# Patient Record
Sex: FEMALE | Race: WHITE | NOT HISPANIC OR LATINO | Employment: OTHER | URBAN - METROPOLITAN AREA
[De-identification: names, ages, dates, MRNs, and addresses within clinical notes are randomized per-mention and may not be internally consistent; named-entity substitution may affect disease eponyms.]

---

## 2022-07-29 ENCOUNTER — HOSPITAL ENCOUNTER (EMERGENCY)
Facility: HOSPITAL | Age: 67
Discharge: HOME | End: 2022-07-29
Attending: EMERGENCY MEDICINE
Payer: MEDICARE

## 2022-07-29 ENCOUNTER — APPOINTMENT (EMERGENCY)
Dept: RADIOLOGY | Facility: HOSPITAL | Age: 67
End: 2022-07-29
Payer: MEDICARE

## 2022-07-29 VITALS
DIASTOLIC BLOOD PRESSURE: 82 MMHG | RESPIRATION RATE: 16 BRPM | TEMPERATURE: 98.2 F | BODY MASS INDEX: 28.69 KG/M2 | HEART RATE: 84 BPM | HEIGHT: 57 IN | SYSTOLIC BLOOD PRESSURE: 172 MMHG | WEIGHT: 133 LBS | OXYGEN SATURATION: 98 %

## 2022-07-29 DIAGNOSIS — R07.9 CHEST PAIN, UNSPECIFIED: ICD-10-CM

## 2022-07-29 DIAGNOSIS — K21.9 GERD (GASTROESOPHAGEAL REFLUX DISEASE): ICD-10-CM

## 2022-07-29 DIAGNOSIS — F41.9 ANXIETY: Primary | ICD-10-CM

## 2022-07-29 LAB
ANION GAP SERPL CALCULATED.3IONS-SCNC: 12 MMOL/L (ref 4–13)
BASOPHILS # BLD AUTO: 0.03 THOUSANDS/ΜL (ref 0–0.1)
BASOPHILS NFR BLD AUTO: 1 % (ref 0–1)
BUN SERPL-MCNC: 9 MG/DL (ref 5–25)
CALCIUM SERPL-MCNC: 9.5 MG/DL (ref 8.4–10.2)
CARDIAC TROPONIN I PNL SERPL HS: 5 NG/L
CHLORIDE SERPL-SCNC: 105 MMOL/L (ref 96–108)
CO2 SERPL-SCNC: 22 MMOL/L (ref 21–32)
CREAT SERPL-MCNC: 0.68 MG/DL (ref 0.6–1.3)
EOSINOPHIL # BLD AUTO: 0.04 THOUSAND/ΜL (ref 0–0.61)
EOSINOPHIL NFR BLD AUTO: 1 % (ref 0–6)
ERYTHROCYTE [DISTWIDTH] IN BLOOD BY AUTOMATED COUNT: 16.6 % (ref 11.6–15.1)
GFR SERPL CREATININE-BSD FRML MDRD: 90 ML/MIN/1.73SQ M
GLUCOSE SERPL-MCNC: 105 MG/DL (ref 65–140)
HCT VFR BLD AUTO: 41.3 % (ref 34.8–46.1)
HGB BLD-MCNC: 13.4 G/DL (ref 11.5–15.4)
IMM GRANULOCYTES # BLD AUTO: 0.02 THOUSAND/UL (ref 0–0.2)
IMM GRANULOCYTES NFR BLD AUTO: 0 % (ref 0–2)
LYMPHOCYTES # BLD AUTO: 1.55 THOUSANDS/ΜL (ref 0.6–4.47)
LYMPHOCYTES NFR BLD AUTO: 27 % (ref 14–44)
MCH RBC QN AUTO: 26.6 PG (ref 26.8–34.3)
MCHC RBC AUTO-ENTMCNC: 32.4 G/DL (ref 31.4–37.4)
MCV RBC AUTO: 82 FL (ref 82–98)
MONOCYTES # BLD AUTO: 0.54 THOUSAND/ΜL (ref 0.17–1.22)
MONOCYTES NFR BLD AUTO: 10 % (ref 4–12)
NEUTROPHILS # BLD AUTO: 3.48 THOUSANDS/ΜL (ref 1.85–7.62)
NEUTS SEG NFR BLD AUTO: 61 % (ref 43–75)
NRBC BLD AUTO-RTO: 0 /100 WBCS
PLATELET # BLD AUTO: 241 THOUSANDS/UL (ref 149–390)
PMV BLD AUTO: 8.8 FL (ref 8.9–12.7)
POTASSIUM SERPL-SCNC: 3.5 MMOL/L (ref 3.5–5.3)
RBC # BLD AUTO: 5.03 MILLION/UL (ref 3.81–5.12)
SODIUM SERPL-SCNC: 139 MMOL/L (ref 135–147)
WBC # BLD AUTO: 5.66 THOUSAND/UL (ref 4.31–10.16)

## 2022-07-29 PROCEDURE — 96376 TX/PRO/DX INJ SAME DRUG ADON: CPT

## 2022-07-29 PROCEDURE — 84484 ASSAY OF TROPONIN QUANT: CPT

## 2022-07-29 PROCEDURE — 71045 X-RAY EXAM CHEST 1 VIEW: CPT

## 2022-07-29 PROCEDURE — 87636 SARSCOV2 & INF A&B AMP PRB: CPT

## 2022-07-29 PROCEDURE — 80048 BASIC METABOLIC PNL TOTAL CA: CPT

## 2022-07-29 PROCEDURE — 99285 EMERGENCY DEPT VISIT HI MDM: CPT

## 2022-07-29 PROCEDURE — 96374 THER/PROPH/DIAG INJ IV PUSH: CPT

## 2022-07-29 PROCEDURE — 85025 COMPLETE CBC W/AUTO DIFF WBC: CPT

## 2022-07-29 PROCEDURE — 93005 ELECTROCARDIOGRAM TRACING: CPT

## 2022-07-29 PROCEDURE — 36415 COLL VENOUS BLD VENIPUNCTURE: CPT

## 2022-07-29 RX ORDER — LORAZEPAM 2 MG/ML
0.5 INJECTION INTRAMUSCULAR ONCE
Status: COMPLETED | OUTPATIENT
Start: 2022-07-29 | End: 2022-07-29

## 2022-07-29 RX ORDER — HYDROXYZINE HYDROCHLORIDE 25 MG/1
25 TABLET, FILM COATED ORAL EVERY 6 HOURS PRN
Qty: 12 TABLET | Refills: 0 | Status: SHIPPED | OUTPATIENT
Start: 2022-07-29

## 2022-07-29 RX ORDER — LIDOCAINE HYDROCHLORIDE 20 MG/ML
15 SOLUTION OROPHARYNGEAL ONCE
Status: COMPLETED | OUTPATIENT
Start: 2022-07-29 | End: 2022-07-29

## 2022-07-29 RX ORDER — MAGNESIUM HYDROXIDE/ALUMINUM HYDROXICE/SIMETHICONE 120; 1200; 1200 MG/30ML; MG/30ML; MG/30ML
30 SUSPENSION ORAL ONCE
Status: COMPLETED | OUTPATIENT
Start: 2022-07-29 | End: 2022-07-29

## 2022-07-29 RX ADMIN — LIDOCAINE HYDROCHLORIDE 15 ML: 20 SOLUTION ORAL; TOPICAL at 16:03

## 2022-07-29 RX ADMIN — ALUMINA, MAGNESIA, AND SIMETHICONE ORAL SUSPENSION REGULAR STRENGTH 30 ML: 1200; 1200; 120 SUSPENSION ORAL at 16:03

## 2022-07-29 RX ADMIN — LORAZEPAM 0.5 MG: 2 INJECTION INTRAMUSCULAR; INTRAVENOUS at 16:03

## 2022-07-29 RX ADMIN — LORAZEPAM 0.5 MG: 2 INJECTION INTRAMUSCULAR; INTRAVENOUS at 15:15

## 2022-07-29 NOTE — ED PROVIDER NOTES
History  Chief Complaint   Patient presents with    Shortness of Breath     Pt complains of SoB on and off since Wednesday night  Pt states that she use albuterol last night and it helps but today it isn't  Pt states that she had a bypass a few years ago with no complications  Pt has Hx asthma  Patient is a 13-year-old female with past medical history of asthma presenting for evaluation of 3 days of shortness of breath  She notes she has been on a road trip from [place] to [place] to visit family including her elderly mother over the last week  Three nights ago, after seeing her mother earlier that day, she felt short of breath and was barely able to sleep  She denies pain but endorses "chest discomfort" and rates it 5/10, like a pressure, and non-radiating  She is able to walk the same distances as usual but feels her breathing gets heavier  She has been worked up outpatient for a chronic cough and recently completed a course of doxycycline which she reports greatly improved her cough  She has not had a return of cough with this new shortness of breath  She denies fevers, chills, sore throat, headache, vision changes, abdominal pain, N/V/D, urinary changes, leg swelling, and skin changes  She notes her and her  have been driving 6-8 hours daily but they stop every 2h for a bathroom break and to stretch their legs  She notes a feeling of anxiety and her  details that seeing her elderly mother has made her much more anxious than normal  She takes a baby aspirin daily for heart health        History provided by:  Patient and significant other   used: No    Shortness of Breath  Severity:  Moderate  Onset quality:  Unable to specify  Duration:  3 days  Timing:  Intermittent  Progression:  Waxing and waning  Chronicity:  New  Context: activity, emotional upset and strong odors    Context: not URI    Relieved by:  Rest and inhaler  Worsened by:  Stress and emotional stress  Ineffective treatments:  Inhaler  Associated symptoms: chest pain    Associated symptoms: no abdominal pain, no cough, no diaphoresis, no ear pain, no fever, no headaches, no hemoptysis, no neck pain, no rash, no sore throat, no sputum production, no syncope, no vomiting and no wheezing    Risk factors: obesity    Risk factors: no recent alcohol use, no family hx of DVT, no hx of cancer, no hx of PE/DVT, no prolonged immobilization, no recent surgery and no tobacco use        None       Past Medical History:   Diagnosis Date    Asthma        History reviewed  No pertinent surgical history  History reviewed  No pertinent family history  I have reviewed and agree with the history as documented  E-Cigarette/Vaping     E-Cigarette/Vaping Substances          Review of Systems   Constitutional: Negative for appetite change, chills, diaphoresis, fatigue, fever and unexpected weight change  HENT: Negative for congestion, ear pain, rhinorrhea, sore throat and trouble swallowing  Eyes: Negative for pain and visual disturbance  Respiratory: Positive for chest tightness and shortness of breath  Negative for cough, hemoptysis, sputum production and wheezing  Cardiovascular: Positive for chest pain  Negative for palpitations, leg swelling and syncope  Gastrointestinal: Negative for abdominal pain, diarrhea, nausea and vomiting  Genitourinary: Negative for difficulty urinating, dysuria, frequency, hematuria and urgency  Musculoskeletal: Negative for arthralgias, back pain and neck pain  Skin: Negative for color change and rash  Neurological: Negative for dizziness, seizures, syncope, weakness, light-headedness and headaches  All other systems reviewed and are negative  Physical Exam  Physical Exam  Vitals and nursing note reviewed  Constitutional:       General: She is awake  She is not in acute distress  Appearance: Normal appearance  She is well-developed and normal weight   She is not toxic-appearing or diaphoretic  Comments: Visibly anxious, fast speech   HENT:      Head: Normocephalic and atraumatic  Jaw: There is normal jaw occlusion  Nose: Nose normal       Mouth/Throat:      Lips: Pink  No lesions  Mouth: Mucous membranes are moist       Pharynx: Oropharynx is clear  Uvula midline  No pharyngeal swelling, oropharyngeal exudate, posterior oropharyngeal erythema or uvula swelling  Tonsils: No tonsillar exudate or tonsillar abscesses  Eyes:      General: Lids are normal  Vision grossly intact  Gaze aligned appropriately  No visual field deficit  Extraocular Movements: Extraocular movements intact  Right eye: Normal extraocular motion  Left eye: Normal extraocular motion  Conjunctiva/sclera: Conjunctivae normal       Right eye: Right conjunctiva is not injected  Left eye: Left conjunctiva is not injected  Comments: Glasses   Neck:      Trachea: Trachea and phonation normal  No abnormal tracheal secretions  Cardiovascular:      Rate and Rhythm: Normal rate and regular rhythm  Pulses: Normal pulses  Radial pulses are 2+ on the right side and 2+ on the left side  Dorsalis pedis pulses are 2+ on the right side and 2+ on the left side  Posterior tibial pulses are 2+ on the right side and 2+ on the left side  Heart sounds: Normal heart sounds, S1 normal and S2 normal  No murmur heard  Pulmonary:      Effort: Pulmonary effort is normal  Tachypnea present  No accessory muscle usage, prolonged expiration, respiratory distress or retractions  Breath sounds: Normal breath sounds and air entry  No stridor, decreased air movement or transmitted upper airway sounds  No decreased breath sounds, wheezing, rhonchi or rales  Chest:      Chest wall: No tenderness  Abdominal:      Palpations: Abdomen is soft  Tenderness: There is no abdominal tenderness  There is no guarding or rebound   Negative signs include Gtz's sign, Rovsing's sign and McBurney's sign  Musculoskeletal:         General: Normal range of motion  Cervical back: Normal range of motion and neck supple  Right lower leg: No edema  Left lower leg: No edema  Skin:     General: Skin is warm and dry  Capillary Refill: Capillary refill takes less than 2 seconds  Findings: No rash  Comments: Warm and well perfused   Neurological:      General: No focal deficit present  Mental Status: She is alert and oriented to person, place, and time  Mental status is at baseline  GCS: GCS eye subscore is 4  GCS verbal subscore is 5  GCS motor subscore is 6  Cranial Nerves: No dysarthria or facial asymmetry  Sensory: Sensation is intact  Motor: Motor function is intact  Coordination: Coordination is intact  Gait: Gait is intact  Gait normal    Psychiatric:         Attention and Perception: Attention and perception normal          Mood and Affect: Affect normal  Mood is anxious  Speech: Speech normal          Behavior: Behavior normal  Behavior is cooperative  Thought Content:  Thought content normal          Cognition and Memory: Cognition and memory normal          Judgment: Judgment normal          Vital Signs  ED Triage Vitals   Temperature Pulse Respirations Blood Pressure SpO2   07/29/22 1342 07/29/22 1342 07/29/22 1342 07/29/22 1342 07/29/22 1342   98 2 °F (36 8 °C) 91 18 (!) 205/86 97 %      Temp Source Heart Rate Source Patient Position - Orthostatic VS BP Location FiO2 (%)   07/29/22 1336 07/29/22 1336 07/29/22 1415 07/29/22 1336 --   Oral Monitor Sitting Left arm       Pain Score       07/29/22 1415       5           Vitals:    07/29/22 1342 07/29/22 1415 07/29/22 1619 07/29/22 1655   BP: (!) 205/86 (!) 189/90 (!) 189/84 (!) 172/82   Pulse: 91 88 83 84   Patient Position - Orthostatic VS:  Sitting Lying Lying         Visual Acuity      ED Medications  Medications   LORazepam (ATIVAN) injection 0 5 mg (0 5 mg Intravenous Given 7/29/22 1515)   LORazepam (ATIVAN) injection 0 5 mg (0 5 mg Intravenous Given 7/29/22 1603)   aluminum-magnesium hydroxide-simethicone (MYLANTA) oral suspension 30 mL (30 mL Oral Given 7/29/22 1603)   Lidocaine Viscous HCl (XYLOCAINE) 2 % mucosal solution 15 mL (15 mL Swish & Spit Given 7/29/22 1603)       Diagnostic Studies  Results Reviewed     Procedure Component Value Units Date/Time    FLU/COVID - if FLU clinically relevant [[id number]] Collected: 07/29/22 1608    Lab Status:  In process Specimen: Nares from Nose Updated: 07/29/22 1611    HS Troponin 0hr (reflex protocol) [[id number]]  (Normal) Collected: 07/29/22 1436    Lab Status: Final result Specimen: Blood from Arm, Left Updated: 07/29/22 1516     hs TnI 0hr 5 ng/L     Basic metabolic panel [[id number]] Collected: 07/29/22 1436    Lab Status: Final result Specimen: Blood from Arm, Left Updated: 07/29/22 1508     Sodium 139 mmol/L      Potassium 3 5 mmol/L      Chloride 105 mmol/L      CO2 22 mmol/L      ANION GAP 12 mmol/L      BUN 9 mg/dL      Creatinine 0 68 mg/dL      Glucose 105 mg/dL      Calcium 9 5 mg/dL      eGFR 90 ml/min/1 73sq m     Narrative:      [place] guidelines for Chronic Kidney Disease (CKD):     Stage 1 with normal or high GFR (GFR > 90 mL/min/1 73 square meters)    Stage 2 Mild CKD (GFR = 60-89 mL/min/1 73 square meters)    Stage 3A Moderate CKD (GFR = 45-59 mL/min/1 73 square meters)    Stage 3B Moderate CKD (GFR = 30-44 mL/min/1 73 square meters)    Stage 4 Severe CKD (GFR = 15-29 mL/min/1 73 square meters)    Stage 5 End Stage CKD (GFR <15 mL/min/1 73 square meters)  Note: GFR calculation is accurate only with a steady state creatinine    CBC and differential [[id number]]  (Abnormal) Collected: 07/29/22 1436    Lab Status: Final result Specimen: Blood from Arm, Left Updated: 07/29/22 1444     WBC 5 66 Thousand/uL      RBC 5 03 Million/uL      Hemoglobin 13 4 g/dL      Hematocrit 41 3 %      MCV 82 fL      MCH 26 6 pg      MCHC 32 4 g/dL      RDW 16 6 %      MPV 8 8 fL      Platelets 353 Thousands/uL      nRBC 0 /100 WBCs      Neutrophils Relative 61 %      Immat GRANS % 0 %      Lymphocytes Relative 27 %      Monocytes Relative 10 %      Eosinophils Relative 1 %      Basophils Relative 1 %      Neutrophils Absolute 3 48 Thousands/µL      Immature Grans Absolute 0 02 Thousand/uL      Lymphocytes Absolute 1 55 Thousands/µL      Monocytes Absolute 0 54 Thousand/µL      Eosinophils Absolute 0 04 Thousand/µL      Basophils Absolute 0 03 Thousands/µL                  XR chest 1 view portable   Final Result by Raji Mckenzie MD (07/29 1440)      No acute cardiopulmonary disease  Workstation performed: [id number]                    Procedures  ECG 12 Lead Documentation Only    Date/Time: 7/29/2022 2:00 PM  Performed by: Timoteo Joy  Authorized by: LUCI Joy     Indications / Diagnosis:  SOB, CP  ECG reviewed by me, the ED Provider: yes    Patient location:  ED  Previous ECG:     Previous ECG:  Unavailable    Comparison to cardiac monitor: Yes    Rate:     ECG rate:  82    ECG rate assessment: normal    Rhythm:     Rhythm: sinus rhythm    Comments:      Sinus rhythm with infrequent PVCs, no ST elevation             ED Course  ED Course as of 07/29/22 1822 Fri Jul 29, 2022   1440 Plan made with pt to take small dose of ativan given anxiety   1530 HS Troponin 0hr (reflex protocol)  Heart score of 3  Chest discomfort x3 days, no need to repeat trop since pain prolonged and constant   1535 Pt updated on results  She notes a mild improvement in her "trouble with breathing"  Plan made to try GI cocktail for ? GERD and 1 more dose of 0 5mg of ativan for anxiety   1654 Pt notes improvement in breathing, no chest discomfort currently   1708 IV removed, dc paperwork provided to pt   VSS, pt well appearing, all questions answered HEART Risk Score    Flowsheet Row Most Recent Value   Heart Score Risk Calculator    History 0 Filed at: 07/29/2022 1530   ECG 0 Filed at: 07/29/2022 1530   Age 2 Filed at: 07/29/2022 1530   Risk Factors 1 Filed at: 07/29/2022 1530   Troponin 0 Filed at: 07/29/2022 1530   HEART Score 3 Filed at: 07/29/2022 1530                            John Arreguin' Criteria for PE    Flowsheet Row Most Recent Value   Wells' Criteria for PE    Clinical signs and symptoms of DVT 0 Filed at: 07/29/2022 1430   PE is primary diagnosis or equally likely 0 Filed at: 07/29/2022 1430   HR >100 0 Filed at: 07/29/2022 1430   Immobilization at least 3 days or Surgery in the previous 4 weeks 0 Filed at: 07/29/2022 1430   Previous, objectively diagnosed PE or DVT 0 Filed at: 07/29/2022 1430   Hemoptysis 0 Filed at: 07/29/2022 1430   Malignancy with treatment within 6 months or palliative 0 Filed at: 07/29/2022 1430   Wells' Criteria Total 0 Filed at: 07/29/2022 1430                MDM  Number of Diagnoses or Management Options  Anxiety: new and does not require workup  Chest pain, unspecified: new and requires workup  GERD (gastroesophageal reflux disease): established and worsening  Diagnosis management comments: Shortness of breath and chest discomfort x3 days: BMP, CBC, trop, EKG, CXR       Amount and/or Complexity of Data Reviewed  Clinical lab tests: ordered and reviewed  Tests in the radiology section of CPT®: ordered and reviewed  Decide to obtain previous medical records or to obtain history from someone other than the patient: yes  Review and summarize past medical records: yes  Discuss the patient with other providers: yes (Dr Jacob Bateman)    Risk of Complications, Morbidity, and/or Mortality  General comments: Assessment: Pt is a 66yF with c/o SOB and chest discomfort  Her ACS work up was negative given her negative troponin and stable EKG  Her CXR was negative for pneumonia and pneumothorax   Her lung and cardiac exam were benign making bronchitis and asthma flare less likely  Her other labs were unremarkable  While in the ED, pt was evidencing anxiety and speaking quickly  I gave her ativan, which vastly improved her shortness or breath  After GI cocktail, she noted an improvement in her chest discomfort  I suspect a combination of anxiety, stress, and GERD as possible causes to her sx  I prescribed Atarax PRN to treat her anxiety  She requested xanax as that is what her mother takes, but given I am not formally diagnosing her with anxiety I told her that it is not an appropriate or safe prescription for her at this time  I discussed the importance of practicing mindfulness and completing de-stressing techniques  Her  was present for education and notes he will encourage her to do deep breathing and emotionally support her  Pt to continue home medications as directed  Dispo: At time of discharge, DC instructions were discussed with patient at bedside  Patient was provided both verbal and written instructions  Patient is understanding of the discharge instructions and is agreeable to plan of care  Return precautions were discussed with patient bedside, patient verbalized understanding of signs and symptoms that would necessitate return to the ED  All questions were answered  Patient was comfortable with the plan of care and discharged to home  Patient stable, in no acute distress and non-toxic at discharge      Patient Progress  Patient progress: resolved      Disposition  Final diagnoses:   Anxiety   GERD (gastroesophageal reflux disease)   Chest pain, unspecified     Time reflects when diagnosis was documented in both MDM as applicable and the Disposition within this note     Time User Action Codes Description Comment    7/29/2022  4:57 PM Deborah Todd [F41 [id number]] Anxiety     7/29/2022  4:57 PM Jesus Ratliff [K21 [id number]] GERD (gastroesophageal reflux disease)     7/29/2022  5:02 PM [facility], [address] [place] [address] [R07 [id number]] Chest pain, unspecified ED Disposition     ED Disposition   Discharge    Condition   Stable    Date/Time   Fri Jul 29, 2022  4:57 PM    Comment   [facility] discharge to home/self care  Follow-up Information     Follow up With Specialties Details Why Contact Info Additional Information    [facility] Emergency Medicine Go to  If symptoms worsen [address] [facility] [id number] [place] [address] Emergency Department, [address] [facility], [id number]    Dr Roz Espinoza a follow appointment with your primary care doctor in 1 week  Discharge Medication List as of 7/29/2022  5:01 PM      START taking these medications    Details   hydrOXYzine HCL (ATARAX) 25 mg tablet Take 1 tablet (25 mg total) by mouth every 6 (six) hours as needed for anxiety, Starting Fri 7/29/2022, Normal             No discharge procedures on file      PDMP Review     None          ED Provider  Electronically Signed by           LUCI Thompson  07/29/22 [address] [place] [address],  Marichuy Juarez  07/29/22 [id number]

## 2022-07-29 NOTE — DISCHARGE INSTRUCTIONS
You were seen for your shortness of breath  Your blood work, EKG, and chest x-ray were normal  I suspect your shortness of breath may be associated with multiple factors: stress, anxiety, and gastric reflux  Continue taking your prescribed daily antacid  Practice mindfulness and de-stress as much as possible; practice deep, slow breathing, play music, and dim lights when you feel anxious  Take the prescribed Hydroxyzine as needed for anxiety  Follow up with your primary care doctor for re-evaluation in 1 week  Return to the ED if you develop severe chest pain, difficulty breathing, abdominal pain, vomiting, or diarrhea

## 2022-07-30 ENCOUNTER — HOSPITAL ENCOUNTER (EMERGENCY)
Facility: HOSPITAL | Age: 67
Discharge: HOME | End: 2022-07-30
Attending: EMERGENCY MEDICINE | Admitting: EMERGENCY MEDICINE
Payer: MEDICARE

## 2022-07-30 ENCOUNTER — APPOINTMENT (EMERGENCY)
Dept: RADIOLOGY | Facility: HOSPITAL | Age: 67
End: 2022-07-30
Payer: MEDICARE

## 2022-07-30 VITALS
SYSTOLIC BLOOD PRESSURE: 123 MMHG | RESPIRATION RATE: 18 BRPM | OXYGEN SATURATION: 100 % | TEMPERATURE: 97.8 F | HEART RATE: 65 BPM | DIASTOLIC BLOOD PRESSURE: 58 MMHG
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
## 2022-07-30 DIAGNOSIS — S51.852A DOG BITE OF LEFT FOREARM, INITIAL ENCOUNTER: Primary | ICD-10-CM

## 2022-07-30 LAB
ATRIAL RATE: 82 BPM
FLUAV RNA RESP QL NAA+PROBE: NEGATIVE
FLUBV RNA RESP QL NAA+PROBE: NEGATIVE
P AXIS: 84 DEGREES
PR INTERVAL: 108 MS
QRS AXIS: 64 DEGREES
QRSD INTERVAL: 96 MS
QT INTERVAL: 364 MS
QTC INTERVAL: 425 MS
SARS-COV-2 RNA RESP QL NAA+PROBE: NEGATIVE
T WAVE AXIS: 182 DEGREES
VENTRICULAR RATE: 82 BPM

## 2022-07-30 PROCEDURE — 99283 EMERGENCY DEPT VISIT LOW MDM: CPT

## 2022-07-30 PROCEDURE — 12001 RPR S/N/AX/GEN/TRNK 2.5CM/<: CPT | Performed by: PHYSICIAN ASSISTANT

## 2022-07-30 PROCEDURE — 99284 EMERGENCY DEPT VISIT MOD MDM: CPT | Performed by: PHYSICIAN ASSISTANT

## 2022-07-30 PROCEDURE — 73090 X-RAY EXAM OF FOREARM: CPT

## 2022-07-30 PROCEDURE — 93010 ELECTROCARDIOGRAM REPORT: CPT | Performed by: INTERNAL MEDICINE

## 2022-07-30 RX ORDER — CEFUROXIME AXETIL 500 MG/1
500 TABLET ORAL EVERY 12 HOURS SCHEDULED
Qty: 14 TABLET | Refills: 0 | Status: SHIPPED | OUTPATIENT
Start: 2022-07-30 | End: 2022-08-06

## 2022-07-30 RX ORDER — ASPIRIN 81 MG/1
81 TABLET ORAL DAILY
COMMUNITY

## 2022-07-30 RX ORDER — METRONIDAZOLE 500 MG/1
500 TABLET ORAL EVERY 8 HOURS SCHEDULED
Qty: 21 TABLET | Refills: 0 | Status: SHIPPED | OUTPATIENT
Start: 2022-07-30 | End: 2022-08-06

## 2022-07-30 RX ORDER — GINSENG 100 MG
1 CAPSULE ORAL ONCE
Status: DISCONTINUED | OUTPATIENT
Start: 2022-07-30 | End: 2022-07-30 | Stop reason: HOSPADM

## 2022-07-30 NOTE — ED PROVIDER NOTES
History  Chief Complaint   Patient presents with    Animal Bite     Pt presenting with a LUE forearm dog bite, the dog is up to date on all his rabies hots  Pt takes aspirin daily  She denies numbness or tingling in LUE  Patient is a 22-year-old female presenting to the ED for evaluation of a dog bite to the left forearm  Patient walked into her friend's home announced and was bit on the left arm by her friend's dog  The dog is up-to-date on all vaccinations including rabies  They think that the dog was caught off guard and say that it is now acting at baseline  The bleeding is controlled at this time  She denies any numbness in the extremity  Patient states that her tetanus is up-to-date within the last 10 years  Prior to Admission Medications   Prescriptions Last Dose Informant Patient Reported? Taking?   aspirin (ECOTRIN LOW STRENGTH) 81 mg EC tablet 7/30/2022 at Unknown time  Yes Yes   Sig: Take 81 mg by mouth daily   hydrOXYzine HCL (ATARAX) 25 mg tablet   No No   Sig: Take 1 tablet (25 mg total) by mouth every 6 (six) hours as needed for anxiety      Facility-Administered Medications: None       Past Medical History:   Diagnosis Date    Asthma        History reviewed  No pertinent surgical history  History reviewed  No pertinent family history  I have reviewed and agree with the history as documented  E-Cigarette/Vaping     E-Cigarette/Vaping Substances          Review of Systems   Constitutional: Negative for appetite change, chills, fatigue and fever  HENT: Negative for congestion, rhinorrhea, sinus pressure, sinus pain and sore throat  Eyes: Negative for photophobia and visual disturbance  Respiratory: Negative for cough, shortness of breath and wheezing  Cardiovascular: Negative for chest pain, palpitations and leg swelling  Gastrointestinal: Negative for abdominal pain, blood in stool, constipation, diarrhea, nausea and vomiting     Genitourinary: Negative for difficulty urinating, dysuria, flank pain, frequency, hematuria and urgency  Musculoskeletal: Negative for arthralgias, back pain, joint swelling, myalgias and neck pain  Skin: Positive for wound (Dog bite, left forearm)  Neurological: Negative for dizziness, syncope, weakness, light-headedness and headaches  All other systems reviewed and are negative  Physical Exam  Physical Exam  Vitals and nursing note reviewed  Constitutional:       General: She is awake  Appearance: Normal appearance  She is well-developed  She is not toxic-appearing or diaphoretic  HENT:      Head: Normocephalic and atraumatic  Right Ear: External ear normal       Left Ear: External ear normal       Nose: Nose normal       Mouth/Throat:      Lips: Pink  Mouth: Mucous membranes are moist    Eyes:      General: Lids are normal  No scleral icterus  Conjunctiva/sclera: Conjunctivae normal       Pupils: Pupils are equal, round, and reactive to light  Cardiovascular:      Rate and Rhythm: Normal rate and regular rhythm  Pulses: Normal pulses  Radial pulses are 2+ on the right side and 2+ on the left side  Heart sounds: Normal heart sounds, S1 normal and S2 normal    Pulmonary:      Effort: Pulmonary effort is normal  No accessory muscle usage  Breath sounds: Normal breath sounds  No stridor  No decreased breath sounds, wheezing, rhonchi or rales  Abdominal:      General: Abdomen is flat  Bowel sounds are normal  There is no distension  Palpations: Abdomen is soft  Tenderness: There is no abdominal tenderness  There is no right CVA tenderness, left CVA tenderness, guarding or rebound  Musculoskeletal:        Arms:       Cervical back: Full passive range of motion without pain and neck supple  No signs of trauma  No pain with movement  Right lower leg: No edema  Left lower leg: No edema  Lymphadenopathy:      Cervical: No cervical adenopathy     Skin:     General: Skin is warm and dry  Capillary Refill: Capillary refill takes less than 2 seconds  Coloration: Skin is not cyanotic, jaundiced or pale  Neurological:      Mental Status: She is alert and oriented to person, place, and time  GCS: GCS eye subscore is 4  GCS verbal subscore is 5  GCS motor subscore is 6  Gait: Gait normal    Psychiatric:         Mood and Affect: Mood normal          Speech: Speech normal          Behavior: Behavior is cooperative  Vital Signs  ED Triage Vitals [07/30/22 1120]   Temperature Pulse Respirations Blood Pressure SpO2   97 8 °F (36 6 °C) 65 18 123/58 100 %      Temp Source Heart Rate Source Patient Position - Orthostatic VS BP Location FiO2 (%)   Oral Monitor Sitting Left arm --      Pain Score       --           Vitals:    07/30/22 1120   BP: 123/58   Pulse: 65   Patient Position - Orthostatic VS: Sitting         Visual Acuity      ED Medications  Medications   bacitracin topical ointment 1 small application (has no administration in time range)       Diagnostic Studies  Results Reviewed     None                 XR forearm 2 views LEFT    (Results Pending)              Procedures  Laceration repair    Date/Time: 7/30/2022 11:56 AM  Performed by: Glennette Spatz, PA-C  Authorized by: Glennette Spatz, PA-C   Consent: Verbal consent obtained  Risks and benefits: risks, benefits and alternatives were discussed  Consent given by: patient  Required items: required blood products, implants, devices, and special equipment available  Patient identity confirmed: verbally with patient  Time out: Immediately prior to procedure a "time out" was called to verify the correct patient, procedure, equipment, support staff and site/side marked as required  Body area: upper extremity  Location details: left lower arm      Procedure Details:  Preparation: Patient was prepped and draped in the usual sterile fashion    Irrigation solution: saline  Irrigation method: tap  Amount of cleaning: standard  Debridement: none  Degree of undermining: none  Skin closure: Steri-Strips  Dressing: 4x4 sterile gauze and gauze roll  Patient tolerance: patient tolerated the procedure well with no immediate complications  Comments: 3 steri-strips used over deeper puncture wound on dorsal aspect of the left forearm  ED Course                               SBIRT 22yo+    Flowsheet Row Most Recent Value   SBIRT (23 yo +)    In order to provide better care to our patients, we are screening all of our patients for alcohol and drug use  Would it be okay to ask you these screening questions? No Filed at: 07/30/2022 1130                    MDM  Number of Diagnoses or Management Options  Dog bite of left forearm, initial encounter  Diagnosis management comments: Patient is a 24-year-old female presenting to the ED for evaluation of a dog bite to the left forearm  Wound was thoroughly cleaned/irrigated at bedside  There are 3 wounds to the left forearm, 1 is slightly deeper due to an avulsed area of soft tissue and steri-strips were used to bring edges together  Gauze and gauze roll were used to cover the wounds  No lacerations or wounds amenable to repair with sutures  X-ray shows no acute fracture  Patient's tetanus is up-to-date  She was offered rabies vaccination series but says that she is not concerned about this as the animal is vaccinated and would prefer to have her friend monitor the animal for 10 days  Wound care instructions discussed in detail  Patient was instructed to return for any signs/symptoms of infection  Antibiotics were sent to patient's pharmacy  The management plan was discussed in detail with the patient at bedside and all questions were answered  Strict ED return instructions were discussed at bedside  Prior to discharge, both verbal and written instructions were provided   We discussed the signs and symptoms that should prompt the patient to return to the ED  All questions were answered and the patient was comfortable with the plan of care and discharged home  The patient agrees to return to the Emergency Department for concerns and/or progression of illness  Amount and/or Complexity of Data Reviewed  Tests in the radiology section of CPT®: ordered and reviewed    Patient Progress  Patient progress: stable      Disposition  Final diagnoses:   Dog bite of left forearm, initial encounter     Time reflects when diagnosis was documented in both MDM as applicable and the Disposition within this note     Time User Action Codes Description Comment    7/30/2022 11:35 AM Claudia Pearce Add [[id number],  Tarah Rapp  [id number]] Dog bite of left forearm, initial encounter       ED Disposition     ED Disposition   Discharge    Condition   Stable    Date/Time   Sat Jul 30, 2022 11:35 AM    Comment   [facility] discharge to home/self care  Follow-up Information     Follow up With Specialties Details Why Contact Info Additional Information    [facility] Emergency Medicine  If symptoms worsen [address] Emergency Department,  [address], [place], [address]          Patient's Medications   Discharge Prescriptions    CEFUROXIME (CEFTIN) 500 MG TABLET    Take 1 tablet (500 mg total) by mouth every 12 (twelve) hours for 7 days       Start Date: 7/30/2022 End Date: 8/6/2022       Order Dose: 500 mg       Quantity: 14 tablet    Refills: 0    METRONIDAZOLE (FLAGYL) 500 MG TABLET    Take 1 tablet (500 mg total) by mouth every 8 (eight) hours for 7 days       Start Date: 7/30/2022 End Date: 8/6/2022       Order Dose: 500 mg       Quantity: 21 tablet    Refills: 0       No discharge procedures on file      PDMP Review     None          ED Provider  Electronically Signed by           Abby Brizuela PA-C  07/30/22 1689